# Patient Record
Sex: MALE | Race: WHITE | NOT HISPANIC OR LATINO | URBAN - METROPOLITAN AREA
[De-identification: names, ages, dates, MRNs, and addresses within clinical notes are randomized per-mention and may not be internally consistent; named-entity substitution may affect disease eponyms.]

---

## 2022-02-22 ENCOUNTER — MEDICAL CORRESPONDENCE (OUTPATIENT)
Dept: HEALTH INFORMATION MANAGEMENT | Facility: CLINIC | Age: 67
End: 2022-02-22
Payer: COMMERCIAL

## 2022-04-11 ENCOUNTER — TRANSCRIBE ORDERS (OUTPATIENT)
Dept: OTHER | Age: 67
End: 2022-04-11

## 2022-04-11 ENCOUNTER — VIRTUAL VISIT (OUTPATIENT)
Dept: UROLOGY | Facility: CLINIC | Age: 67
End: 2022-04-11
Payer: COMMERCIAL

## 2022-04-11 VITALS — WEIGHT: 220 LBS | HEIGHT: 70 IN | BODY MASS INDEX: 31.5 KG/M2

## 2022-04-11 DIAGNOSIS — R97.20 ELEVATED PROSTATE SPECIFIC ANTIGEN (PSA): Primary | ICD-10-CM

## 2022-04-11 PROCEDURE — 99203 OFFICE O/P NEW LOW 30 MIN: CPT | Mod: GT | Performed by: UROLOGY

## 2022-04-11 RX ORDER — LORAZEPAM 0.5 MG/1
0.5 TABLET ORAL
COMMUNITY
Start: 2021-11-23 | End: 2022-05-02

## 2022-04-11 RX ORDER — ZOLPIDEM TARTRATE 5 MG/1
5 TABLET ORAL
COMMUNITY
Start: 2021-11-23

## 2022-04-11 ASSESSMENT — PAIN SCALES - GENERAL: PAINLEVEL: NO PAIN (0)

## 2022-04-11 NOTE — LETTER
Date:April 12, 2022      Patient was self referred, no letter generated. Do not send.        [facility] Health Information

## 2022-04-11 NOTE — PROGRESS NOTES
*PT WILL MEET YOU IN MYCHART    Tai is a 66 year old who is being evaluated via a billable video visit.      How would you like to obtain your AVS? [facility]  If the video visit is dropped, the invitation should be resent by: Text to cell phone: [phone]  Will anyone else be joining your video visit? [facility]  Main Office: [address]  [address]  [place] [address]       CHIEF COMPLAINT:  Elevated PSA    HISTORY:   I was asked by Dr. Parra at the [facility] to see this 66-year-old gentleman who presents with an elevated PSA.  His PSA was first elevated in 2020.  He reports that he saw a urologist with [facility] with his elevated PSA and was treated with antibiotics.  He says that the PSA improved but never went back into the normal range.  It went down to 4.4.  His PSA was rechecked recently and it was up at 6.84, his highest level.  He has not had any further testing such as MRI or biopsy.  He also reports he has never had a digital rectal examination.  He has no family history of prostate cancer.  No urinary symptoms or complaints.  No history of hematuria or infections.      PAST MEDICAL HISTORY: History reviewed. No pertinent past medical history.    PAST SURGICAL HISTORY:   Past Surgical History:   Procedure Laterality Date     HERNIA REPAIR         FAMILY HISTORY: No family history on file.    SOCIAL HISTORY:   Social History     Tobacco Use     Smoking status: Never Smoker     Smokeless tobacco: Never Used   Substance Use Topics     Alcohol use: Not on file        No Known Allergies      Current Outpatient Medications:      LORazepam (ATIVAN) 0.5 MG tablet, Take 0.5 mg by mouth (Patient not taking: Reported on 4/11/2022), Disp: , Rfl:      zolpidem (AMBIEN) 5 MG tablet, Take 5 mg by mouth (Patient not taking: Reported on 4/11/2022), Disp: , Rfl:     Review Of Systems:  Skin: No rash, pruritis, or skin pigmentation  Eyes: No changes in vision  Ears/Nose/Throat:  No changes in hearing, no nosebleeds  Respiratory: No shortness of breath, dyspnea on exertion, cough, or hemoptysis  Cardiovascular: No chest pain or palpitations  Gastrointestinal: No diarrhea or constipation. No abdominal pain. No hematochezia  Genitourinary: see HPI  Musculoskeletal: No pain or swelling of joints, normal range of motion  Neurologic: No weakness or tremors  Psychiatric: No recent changes in memory or mood  Hematologic/Lymphatic/Immunologic: No easy bruising or enlarged lymph nodes  Endocrine: No weight gain or loss      PHYSICAL EXAM:    General: Alert and oriented to time, place, and self. In NAD   HEENT: Head AT/NC, EOMI, CN Grossly intact   Lungs: no respiratory distress, or pursed lip breathing   Heart: No obvious jugular venous distension present   Musculoskeltal: Normal movements. Normal appearing musculature  Skin: no suspicious lesions or rashes   Neuro: Alert, oriented, speech and mentation normal; moving all 4 extremities equally.   Psych: affect and mood normal      PSA: 6.84    UA RESULTS:  No results for input(s): COLOR, APPEARANCE, URINEGLC, URINEBILI, URINEKETONE, SG, UBLD, URINEPH, PROTEIN, UROBILINOGEN, NITRITE, LEUKEST, RBCU, WBCU in the last 05539 hours.    Bladder Scan:     Other Labs:      Imaging Studies: None      CLINICAL IMPRESSION:   Elevated PSA    PLAN:   His PSA has fluctuated in the past 2 years but it has been elevated on 4 separate checks in the past 2 years.  At this time I recommended that he at least have a digital rectal examination and the PSA rechecked, but it would also be prudent to proceed with an MRI of the prostate.  We discussed all options and he wishes to proceed with MRI of the prostate.  He understands that he may potentially have a prostate biopsy, which was discussed with him.  MRI of the prostate was ordered for him and I will go over the results with him via virtual visit.      Juan Carlos Song MD      Video Start Time: 3:15 PM  Video-Visit  Details    Type of service:  Video Visit    Video End Time:3:27 PM    Originating Location (pt. Location): Home    Distant Location (provider location):  [facility]     Platform used for Video Visit: [employer]

## 2022-04-11 NOTE — LETTER
4/11/2022       RE: Tai Gilbert  [address]  [place] [address]     Dear Colleague,    Thank you for referring your patient, Tai Gilbert, to the [facility] [place] at [facility]. Please see a copy of my visit note below.    *PT WILL MEET YOU IN GigaloHART    Tai is a 66 year old who is being evaluated via a billable video visit.      How would you like to obtain your AVS? MyChart  If the video visit is dropped, the invitation should be resent by: Text to cell phone: [phone]  Will anyone else be joining your video visit? No         [facility]  Main Office: [address]  [address]  [place] [address]       CHIEF COMPLAINT:  Elevated PSA    HISTORY:   I was asked by Dr. Parra at the [facility] to see this 66-year-old gentleman who presents with an elevated PSA.  His PSA was first elevated in 2020.  He reports that he saw a urologist with [facility] with his elevated PSA and was treated with antibiotics.  He says that the PSA improved but never went back into the normal range.  It went down to 4.4.  His PSA was rechecked recently and it was up at 6.84, his highest level.  He has not had any further testing such as MRI or biopsy.  He also reports he has never had a digital rectal examination.  He has no family history of prostate cancer.  No urinary symptoms or complaints.  No history of hematuria or infections.      PAST MEDICAL HISTORY: History reviewed. No pertinent past medical history.    PAST SURGICAL HISTORY:   Past Surgical History:   Procedure Laterality Date     HERNIA REPAIR         FAMILY HISTORY: No family history on file.    SOCIAL HISTORY:   Social History     Tobacco Use     Smoking status: Never Smoker     Smokeless tobacco: Never Used   Substance Use Topics     Alcohol use: Not on file        No Known Allergies      Current Outpatient Medications:      LORazepam (ATIVAN) 0.5 MG tablet, Take 0.5 mg  by mouth (Patient not taking: Reported on 4/11/2022), Disp: , Rfl:      zolpidem (AMBIEN) 5 MG tablet, Take 5 mg by mouth (Patient not taking: Reported on 4/11/2022), Disp: , Rfl:     Review Of Systems:  Skin: No rash, pruritis, or skin pigmentation  Eyes: No changes in vision  Ears/Nose/Throat: No changes in hearing, no nosebleeds  Respiratory: No shortness of breath, dyspnea on exertion, cough, or hemoptysis  Cardiovascular: No chest pain or palpitations  Gastrointestinal: No diarrhea or constipation. No abdominal pain. No hematochezia  Genitourinary: see HPI  Musculoskeletal: No pain or swelling of joints, normal range of motion  Neurologic: No weakness or tremors  Psychiatric: No recent changes in memory or mood  Hematologic/Lymphatic/Immunologic: No easy bruising or enlarged lymph nodes  Endocrine: No weight gain or loss      PHYSICAL EXAM:    General: Alert and oriented to time, place, and self. In NAD   HEENT: Head AT/NC, EOMI, CN Grossly intact   Lungs: no respiratory distress, or pursed lip breathing   Heart: No obvious jugular venous distension present   Musculoskeltal: Normal movements. Normal appearing musculature  Skin: no suspicious lesions or rashes   Neuro: Alert, oriented, speech and mentation normal; moving all 4 extremities equally.   Psych: affect and mood normal      PSA: 6.84    UA RESULTS:  No results for input(s): COLOR, APPEARANCE, URINEGLC, URINEBILI, URINEKETONE, SG, UBLD, URINEPH, PROTEIN, UROBILINOGEN, NITRITE, LEUKEST, RBCU, WBCU in the last 34249 hours.    Bladder Scan:     Other Labs:      Imaging Studies: None      CLINICAL IMPRESSION:   Elevated PSA    PLAN:   His PSA has fluctuated in the past 2 years but it has been elevated on 4 separate checks in the past 2 years.  At this time I recommended that he at least have a digital rectal examination and the PSA rechecked, but it would also be prudent to proceed with an MRI of the prostate.  We discussed all options and he wishes to  proceed with MRI of the prostate.  He understands that he may potentially have a prostate biopsy, which was discussed with him.  MRI of the prostate was ordered for him and I will go over the results with him via virtual visit.      Juan Carlos Song MD      Video Start Time: 3:15 PM  Video-Visit Details    Type of service:  Video Visit    Video End Time:3:27 PM    Originating Location (pt. Location): Home    Distant Location (provider location):  [facility] [place]     Platform used for Video Visit: [employer]      Again, thank you for allowing me to participate in the care of your patient.      Sincerely,    Juan Carlos Song MD

## 2022-05-01 ENCOUNTER — ANCILLARY PROCEDURE (OUTPATIENT)
Dept: MRI IMAGING | Facility: CLINIC | Age: 67
End: 2022-05-01
Attending: UROLOGY
Payer: COMMERCIAL

## 2022-05-01 DIAGNOSIS — R97.20 ELEVATED PROSTATE SPECIFIC ANTIGEN (PSA): ICD-10-CM

## 2022-05-01 PROCEDURE — 72197 MRI PELVIS W/O & W/DYE: CPT | Performed by: RADIOLOGY

## 2022-05-01 PROCEDURE — A9585 GADOBUTROL INJECTION: HCPCS | Performed by: RADIOLOGY

## 2022-05-01 RX ORDER — GADOBUTROL 604.72 MG/ML
10 INJECTION INTRAVENOUS ONCE
Status: COMPLETED | OUTPATIENT
Start: 2022-05-01 | End: 2022-05-01

## 2022-05-01 RX ADMIN — GADOBUTROL 10 ML: 604.72 INJECTION INTRAVENOUS at 08:11

## 2022-05-01 NOTE — DISCHARGE INSTRUCTIONS
MRI Contrast Discharge Instructions    The IV contrast you received today will pass out of your body in your  urine. This will happen in the next 24 hours. You will not feel this process.  Your urine will not change color.    Drink at least 4 extra glasses of water or juice today (unless your doctor  has restricted your fluids). This reduces the stress on your kidneys.  You may take your regular medicines.    If you are on dialysis: It is best to have dialysis today.    If you have a reaction: Most reactions happen right away. If you have  any new symptoms after leaving the hospital (such as hives or swelling),  call your hospital at the correct number below. Or call your family doctor.  If you have breathing distress or wheezing, call 911.    Special instructions: ***    I have read and understand the above information.    Signature:______________________________________ Date:___________    Staff:__________________________________________ Date:___________     Time:__________    [place] [facility] Departments:    ___[place]: [phone]  ___[facility]: [phone]  ___[place]: [phone] ___[facility]: [phone]  ___[facility]: [phone]  ___[facility]: [phone]  ___[phone]  ___[facility]: [phone]  ___[phone]

## 2022-05-02 ENCOUNTER — VIRTUAL VISIT (OUTPATIENT)
Dept: UROLOGY | Facility: CLINIC | Age: 67
End: 2022-05-02
Payer: COMMERCIAL

## 2022-05-02 VITALS — HEIGHT: 70 IN | WEIGHT: 220 LBS | BODY MASS INDEX: 31.5 KG/M2

## 2022-05-02 DIAGNOSIS — R97.20 ELEVATED PROSTATE SPECIFIC ANTIGEN (PSA): Primary | ICD-10-CM

## 2022-05-02 PROCEDURE — 99214 OFFICE O/P EST MOD 30 MIN: CPT | Mod: GT | Performed by: UROLOGY

## 2022-05-02 RX ORDER — CIPROFLOXACIN 500 MG/1
500 TABLET, FILM COATED ORAL 2 TIMES DAILY
Qty: 6 TABLET | Refills: 0 | Status: SHIPPED | OUTPATIENT
Start: 2022-05-02 | End: 2022-05-05

## 2022-05-02 ASSESSMENT — PAIN SCALES - GENERAL: PAINLEVEL: NO PAIN (0)

## 2022-05-02 NOTE — LETTER
5/2/2022       RE: Tai Gilbert  [address]  [place] [address]     Dear Colleague,    Thank you for referring your patient, Tai Gilbert, to the [facility] [place] at [facility]. Please see a copy of my visit note below.    TEXTLINK to cell phone.  [phone]  Tai is a 66 year old who is being evaluated via a billable video visit.      How would you like to obtain your AVS? MyChart  If the video visit is dropped, the invitation should be resent by: Text to cell phone: .  Will anyone else be joining your video visit? No         Office Visit Note  [facility]  [phone]    UROLOGIC DIAGNOSES:   Elevated PSA    CURRENT INTERVENTIONS:   MRI prostate    HISTORY:   Tai underwent MRI prostate and it showed a PI-RADS 5 lesion on the right side of the prostate.  There is also some potential inflammation/prostatitis located posteriorly in the prostate.  He feels well with no complaints at this time.      PAST MEDICAL HISTORY: History reviewed. No pertinent past medical history.    PAST SURGICAL HISTORY:   Past Surgical History:   Procedure Laterality Date     HERNIA REPAIR         FAMILY HISTORY: No family history on file.    SOCIAL HISTORY:   Social History     Tobacco Use     Smoking status: Never Smoker     Smokeless tobacco: Never Used   Substance Use Topics     Alcohol use: Not on file       REVIEW OF SYSTEMS:  Skin: No rash, pruritis, or skin pigmentation  Eyes: No changes in vision  Ears/Nose/Throat: No changes in hearing, no nosebleeds  Respiratory: No shortness of breath, dyspnea on exertion, cough, or hemoptysis  Cardiovascular: No chest pain or palpitations  Gastrointestinal: No diarrhea or constipation. No abdominal pain. No hematochezia  Genitourinary: see HPI  Musculoskeletal: No pain or swelling of joints, normal range of motion  Neurologic: No weakness or tremors  Psychiatric: No recent changes in memory or  mood  Hematologic/Lymphatic/Immunologic: No easy bruising or enlarged lymph nodes  Endocrine: No weight gain or loss      PHYSICAL EXAM:    General: Alert and oriented to time, place, and self. In NAD   HEENT: Head AT/NC, EOMI, CN Grossly intact   Lungs: no respiratory distress, or pursed lip breathing   Heart: No obvious jugular venous distension present   Musculoskeltal: Normal movements. Normal appearing musculature  Skin: no suspicious lesions or rashes   Neuro: Alert, oriented, speech and mentation normal; moving all 4 extremities equally.   Psych: affect and mood normal    Imaging: None    Urinalysis: UA RESULTS:  No results for input(s): COLOR, APPEARANCE, URINEGLC, URINEBILI, URINEKETONE, SG, UBLD, URINEPH, PROTEIN, UROBILINOGEN, NITRITE, LEUKEST, RBCU, WBCU in the last 85997 hours.    PSA: 6.84    Post Void Residual:     Other labs: None today      IMPRESSION:  Elevated PSA    PLAN:  We discussed his MRI results.  The MRI is very concerning for the presence of a prostate cancer.  There is also potentially some inflammation in the prostate.  I recommended an MRI guided fusion prostate biopsy.  We discussed prostate biopsy today along with its risks, including bleeding and infection.  We did discuss that theoretic increased risk of infection given the potential prostatitis/inflammation seen on the biopsy.  In light of this I recommended 72 hours of antibiotics as well as an injection of gentamicin at the time of biopsy in order to prevent infection.  He agrees to this and wishes to proceed.  We will get him scheduled for uronav prostate biopsy in the near future.      Juan Carlos Song M.D.              Video Start Time: 11:42 AM  Video-Visit Details    Type of service:  Video Visit    Video End Time:11:57 AM    Originating Location (pt. Location): Home    Distant Location (provider location):  [facility] [place]     Platform used for Video Visit: [employer]      Again, thank you for  allowing me to participate in the care of your patient.      Sincerely,    Juan Carlos Song MD

## 2022-05-02 NOTE — PROGRESS NOTES
TEXTLINK to cell phone.  [phone]  Tai is a 66 year old who is being evaluated via a billable video visit.      How would you like to obtain your AVS? MyChart  If the video visit is dropped, the invitation should be resent by: Text to cell phone: .  Will anyone else be joining your video visit? No         Office Visit Note  [facility]  [phone]    UROLOGIC DIAGNOSES:   Elevated PSA    CURRENT INTERVENTIONS:   MRI prostate    HISTORY:   Tai underwent MRI prostate and it showed a PI-RADS 5 lesion on the right side of the prostate.  There is also some potential inflammation/prostatitis located posteriorly in the prostate.  He feels well with no complaints at this time.      PAST MEDICAL HISTORY: History reviewed. No pertinent past medical history.    PAST SURGICAL HISTORY:   Past Surgical History:   Procedure Laterality Date     HERNIA REPAIR         FAMILY HISTORY: No family history on file.    SOCIAL HISTORY:   Social History     Tobacco Use     Smoking status: Never Smoker     Smokeless tobacco: Never Used   Substance Use Topics     Alcohol use: Not on file       REVIEW OF SYSTEMS:  Skin: No rash, pruritis, or skin pigmentation  Eyes: No changes in vision  Ears/Nose/Throat: No changes in hearing, no nosebleeds  Respiratory: No shortness of breath, dyspnea on exertion, cough, or hemoptysis  Cardiovascular: No chest pain or palpitations  Gastrointestinal: No diarrhea or constipation. No abdominal pain. No hematochezia  Genitourinary: see HPI  Musculoskeletal: No pain or swelling of joints, normal range of motion  Neurologic: No weakness or tremors  Psychiatric: No recent changes in memory or mood  Hematologic/Lymphatic/Immunologic: No easy bruising or enlarged lymph nodes  Endocrine: No weight gain or loss      PHYSICAL EXAM:    General: Alert and oriented to time, place, and self. In NAD   HEENT: Head AT/NC, EOMI, CN Grossly intact   Lungs: no respiratory distress, or pursed lip breathing    Heart: No obvious jugular venous distension present   Musculoskeltal: Normal movements. Normal appearing musculature  Skin: no suspicious lesions or rashes   Neuro: Alert, oriented, speech and mentation normal; moving all 4 extremities equally.   Psych: affect and mood normal    Imaging: None    Urinalysis: UA RESULTS:  No results for input(s): COLOR, APPEARANCE, URINEGLC, URINEBILI, URINEKETONE, SG, UBLD, URINEPH, PROTEIN, UROBILINOGEN, NITRITE, LEUKEST, RBCU, WBCU in the last 69839 hours.    PSA: 6.84    Post Void Residual:     Other labs: None today      IMPRESSION:  Elevated PSA    PLAN:  We discussed his MRI results.  The MRI is very concerning for the presence of a prostate cancer.  There is also potentially some inflammation in the prostate.  I recommended an MRI guided fusion prostate biopsy.  We discussed prostate biopsy today along with its risks, including bleeding and infection.  We did discuss that theoretic increased risk of infection given the potential prostatitis/inflammation seen on the biopsy.  In light of this I recommended 72 hours of antibiotics as well as an injection of gentamicin at the time of biopsy in order to prevent infection.  He agrees to this and wishes to proceed.  We will get him scheduled for uronav prostate biopsy in the near future.      Juan Carlos Song M.D.              Video Start Time: 11:42 AM  Video-Visit Details    Type of service:  Video Visit    Video End Time:11:57 AM    Originating Location (pt. Location): Home    Distant Location (provider location):  [facility] UROLOG[facility]     Platform used for Video Visit: [employer]

## 2022-05-29 ENCOUNTER — HEALTH MAINTENANCE LETTER (OUTPATIENT)
Age: 67
End: 2022-05-29

## 2022-06-07 ENCOUNTER — OFFICE VISIT (OUTPATIENT)
Dept: UROLOGY | Facility: CLINIC | Age: 67
End: 2022-06-07
Payer: COMMERCIAL

## 2022-06-07 VITALS
HEIGHT: 70 IN | DIASTOLIC BLOOD PRESSURE: 90 MMHG | OXYGEN SATURATION: 97 % | HEART RATE: 66 BPM | BODY MASS INDEX: 31.5 KG/M2 | WEIGHT: 220 LBS | SYSTOLIC BLOOD PRESSURE: 173 MMHG

## 2022-06-07 DIAGNOSIS — Z79.2 PROPHYLACTIC ANTIBIOTIC: Primary | ICD-10-CM

## 2022-06-07 DIAGNOSIS — R97.20 ELEVATED PROSTATE SPECIFIC ANTIGEN (PSA): ICD-10-CM

## 2022-06-07 PROCEDURE — 55700 PR BIOPSY OF PROSTATE,NEEDLE/PUNCH: CPT | Performed by: UROLOGY

## 2022-06-07 PROCEDURE — 76942 ECHO GUIDE FOR BIOPSY: CPT | Performed by: UROLOGY

## 2022-06-07 PROCEDURE — 96372 THER/PROPH/DIAG INJ SC/IM: CPT | Mod: 59 | Performed by: UROLOGY

## 2022-06-07 PROCEDURE — 88305 TISSUE EXAM BY PATHOLOGIST: CPT | Performed by: PATHOLOGY

## 2022-06-07 RX ORDER — LIDOCAINE HYDROCHLORIDE 10 MG/ML
15 INJECTION, SOLUTION INFILTRATION; PERINEURAL ONCE
Status: COMPLETED | OUTPATIENT
Start: 2022-06-07 | End: 2022-06-07

## 2022-06-07 RX ORDER — GENTAMICIN 40 MG/ML
80 INJECTION, SOLUTION INTRAMUSCULAR; INTRAVENOUS ONCE
Status: COMPLETED | OUTPATIENT
Start: 2022-06-07 | End: 2022-06-07

## 2022-06-07 RX ORDER — CIPROFLOXACIN 500 MG/1
TABLET, FILM COATED ORAL
COMMUNITY
Start: 2022-05-31

## 2022-06-07 RX ADMIN — LIDOCAINE HYDROCHLORIDE 15 ML: 10 INJECTION, SOLUTION INFILTRATION; PERINEURAL at 09:35

## 2022-06-07 RX ADMIN — GENTAMICIN 80 MG: 40 INJECTION, SOLUTION INTRAMUSCULAR; INTRAVENOUS at 09:31

## 2022-06-07 ASSESSMENT — PAIN SCALES - GENERAL: PAINLEVEL: NO PAIN (0)

## 2022-06-07 NOTE — PROGRESS NOTES
Here for an MRI-ultrasound-fusion guided biopsy of the prostate    We previously obtained an MRI of the prostate and identified all PIRADS 4-5 lesions for targeting    Target Lesion #1 Right sided lesion    Prostate volume on MRI 34 g    No results found for: PSA    An enema was completed and 500 mg of Cipro was given prior to the biopsy.  After obtaining informed consent patient was placed in lateral decubitus position.  The ultrasound probe was placed in the rectum.  The prostate was numbed using ultrasound guidance with 1% lidocaine 5 mls along each nerve bundle.  US images were obtained and then fused with the MRI images.  The fused images were then used to guide the biopsy of the targeted lesions with at least 2 cores taken of each lesion.  We then performed random biopsies.  12 cores were taken with 6 on each side, base, mid and apex.  The patient tolerated the procedure well.      We will follow up with the results in 7-10 days and contact patient with these results.

## 2022-06-07 NOTE — NURSING NOTE
he following medication was given:     MEDICATION:  Lidocaine 1% Soln  ROUTE: Local Infiltration   SITE: Prostate  DOSE: 200mg/20ml  LOT #: [id number]  : [employer]  EXPIRATION DATE:01/2024    NDC#: 80552-508-66  Was there drug waste? Yes  Amount of drug waste (mL): 0.  Reason for waste:  As per MD  Multi-dose vial: Yes    Margarita Bah LPN

## 2022-06-07 NOTE — NURSING NOTE
Chief Complaint   Patient presents with     Elevated PSA     Patient is here for Transrectal Ultrasound/MRI Guided Prostate Biopsies      Procedure was explained to patient prior to performing said procedure.  The patient signed the [place] consent form and all questions were answered prior to the procedure.  Any pre-procedural antibiotics were given according to the performing physician's recommendation.  Patient reviewed information on the labels attached to samples and confirmed the accuracy of all of the labels.     Performing Physician:  Dr. Song  Antibiotic taken?  yes  Aspirin or other blood thinning medications discontinued 7-10 days:  yes  Time of enema: Yes  Patient given Gentamicin intramuscular injection 30 minutes prior to procedure  Yes    Twelve samples were taken from the right and left, medial and lateral base, mid and apex of the prostate respectively. Three  additional samples were taken from Target Lesion.  Vitals were repeated prior to patient leaving and instructions for post TRUS care were explained to the patient. Following procedure, patient did not want to stay to recheck BP. Instructed him to call our office or go to ER if feeling unwell. Patient expressed understanding and informed this nurse that his wife will drive him home.     Margarita Bah LPN

## 2022-06-07 NOTE — LETTER
6/7/2022       RE: Tai Gilbert  [address]  [place] [address]     Dear Colleague,    Thank you for referring your patient, Tai Gilbert, to the [facility]OLOG[facility] CLIN[facility] [place] at [facility]. Please see a copy of my visit note below.    Here for an MRI-ultrasound-fusion guided biopsy of the prostate    We previously obtained an MRI of the prostate and identified all PIRADS 4-5 lesions for targeting    Target Lesion #1 Right sided lesion    Prostate volume on MRI 34 g    No results found for: PSA    An enema was completed and 500 mg of Cipro was given prior to the biopsy.  After obtaining informed consent patient was placed in lateral decubitus position.  The ultrasound probe was placed in the rectum.  The prostate was numbed using ultrasound guidance with 1% lidocaine 5 mls along each nerve bundle.  US images were obtained and then fused with the MRI images.  The fused images were then used to guide the biopsy of the targeted lesions with at least 2 cores taken of each lesion.  We then performed random biopsies.  12 cores were taken with 6 on each side, base, mid and apex.  The patient tolerated the procedure well.      We will follow up with the results in 7-10 days and contact patient with these results.       Again, thank you for allowing me to participate in the care of your patient.      Sincerely,    Juan Carlos Song MD

## 2022-06-07 NOTE — NURSING NOTE
The following medication was given:     MEDICATION: Gentamycin  ROUTE: IM  SITE: [facility]  DOSE: 80mg  LOT #: [id number]  :    EXPIRATION DATE:  03/23  NDC#: 21413674161   Drug wasted  No .

## 2022-06-07 NOTE — PATIENT INSTRUCTIONS
[facility]  Transrectal Ultrasound  Post Operative Information    The physician who performed your Transrectal Ultrasound is Dr. Song (telephone number [phone]).  Please contact this doctor if you have any problems or questions.  If unable to reach your doctor, please return to the Emergency Department.    Take one antibiotic the evening of the procedure and then as directed on your prescription.  Drink at least 6-8 glasses of fluids for the first 48 hours.  Avoid heavy lifting and strenuous activity for 48 hours.  Avoid sexual intercourse for the first 24 hours.  No aspirin or ibuprofen products (Motrin, Advil, Nuprin, ect.) for one week.  You may take acetaminophen (Tylenol) for pain.  You may notice a small amount of blood on the tissue after a bowel movement.  You may pass blood with clots in your urine following the procedure.  The amount will decrease with time but may be visible for up to two weeks.   You make have blood in your semen for 4 weeks after the procedure.  You may experience mild perineal (groin area) discomfort after the procedure.  Please call you doctor if you have any of the follow symptoms:  Fever  Increase in the amount of blood passed  Severe discomfort or pain

## 2022-06-08 LAB
PATH REPORT.COMMENTS IMP SPEC: ABNORMAL
PATH REPORT.COMMENTS IMP SPEC: YES
PATH REPORT.FINAL DX SPEC: ABNORMAL
PATH REPORT.GROSS SPEC: ABNORMAL
PATH REPORT.MICROSCOPIC SPEC OTHER STN: ABNORMAL
PATH REPORT.RELEVANT HX SPEC: ABNORMAL
PHOTO IMAGE: ABNORMAL

## 2022-06-09 ENCOUNTER — VIRTUAL VISIT (OUTPATIENT)
Dept: UROLOGY | Facility: CLINIC | Age: 67
End: 2022-06-09
Payer: COMMERCIAL

## 2022-06-09 VITALS — BODY MASS INDEX: 31.5 KG/M2 | WEIGHT: 220 LBS | HEIGHT: 70 IN

## 2022-06-09 DIAGNOSIS — C61 PROSTATE CANCER (H): Primary | ICD-10-CM

## 2022-06-09 PROCEDURE — 99215 OFFICE O/P EST HI 40 MIN: CPT | Mod: GT | Performed by: UROLOGY

## 2022-06-09 ASSESSMENT — PAIN SCALES - GENERAL: PAINLEVEL: NO PAIN (0)

## 2022-06-09 NOTE — PROGRESS NOTES
SEND LINK TO CELL PHONE    Tai is a 66 year old who is being evaluated via a billable video visit.      How would you like to obtain your AVS? MyChart  If the video visit is dropped, the invitation should be resent by: Text to cell phone: [phone]  Will anyone else be joining your video visit? No         Office Visit Note  [facility]  [phone]    UROLOGIC DIAGNOSES:   T1C [place] 3+4 equal 7 prostate cancer    CURRENT INTERVENTIONS:       HISTORY:   Tai is set up for virtual visit today in order to go over his prostate biopsy results.  The targeted biopsies taken from the lesion seen on the right side of the prostate showed a [place] 3+4 equal 7 prostate cancer.  In addition, 9 out of 12 of his template cores were also positive for prostate cancer.  Positive cores were located on both sides and  showed both [place] grade 6 and grade 7 cancer.  He has felt well since his biopsy and has no urinary complaints.  He reports normal erectile function without the help of any medication.  He has had a prior inguinal hernia repair and appendectomy.      PAST MEDICAL HISTORY: History reviewed. No pertinent past medical history.    PAST SURGICAL HISTORY:   Past Surgical History:   Procedure Laterality Date     HERNIA REPAIR         FAMILY HISTORY:   Family History   Problem Relation Age of Onset     Heart Disease Mother      Cancer Father      Enlarged prostate Son        SOCIAL HISTORY:   Social History     Tobacco Use     Smoking status: Never Smoker     Smokeless tobacco: Never Used   Substance Use Topics     Alcohol use: Not on file       REVIEW OF SYSTEMS:  Skin: No rash, pruritis, or skin pigmentation  Eyes: No changes in vision  Ears/Nose/Throat: No changes in hearing, no nosebleeds  Respiratory: No shortness of breath, dyspnea on exertion, cough, or hemoptysis  Cardiovascular: No chest pain or palpitations  Gastrointestinal: No diarrhea or constipation. No abdominal pain. No  hematochezia  Genitourinary: see HPI  Musculoskeletal: No pain or swelling of joints, normal range of motion  Neurologic: No weakness or tremors  Psychiatric: No recent changes in memory or mood  Hematologic/Lymphatic/Immunologic: No easy bruising or enlarged lymph nodes  Endocrine: No weight gain or loss      PHYSICAL EXAM:    General: Alert and oriented to time, place, and self. In NAD   HEENT: Head AT/NC, EOMI, CN Grossly intact   Lungs: no respiratory distress, or pursed lip breathing   Heart: No obvious jugular venous distension present   Musculoskeltal: Normal movements. Normal appearing musculature  Skin: no suspicious lesions or rashes   Neuro: Alert, oriented, speech and mentation normal; moving all 4 extremities equally.   Psych: affect and mood normal    Imaging: None    Urinalysis: UA RESULTS:  No results for input(s): COLOR, APPEARANCE, URINEGLC, URINEBILI, URINEKETONE, SG, UBLD, URINEPH, PROTEIN, UROBILINOGEN, NITRITE, LEUKEST, RBCU, WBCU in the last 78751 hours.    PSA: 6.84    Post Void Residual:     Other labs: None today      IMPRESSION:  Favorable intermediate risk prostate cancer    PLAN:  He has been diagnosed with a favorable intermediate risk prostate cancer.  We discussed the prostate cancer diagnosis along with the natural history of prostate cancer.  We discussed treatment options.    We discussed active surveillance as treatment option.  We discussed the rationale of active surveillance and the risks of active surveillance.  I counseled him that I would not recommend active surveillance in his case given his good health and his great 7 cancer with multiple cores positive.    We discussed robotic assisted laparoscopic radical prostatectomy with bilateral pelvic lymph node as a treatment option.  We discussed the risks of surgery along with its expected recovery.    We discussed radiotherapy combined with 6 months of hormonal therapy as a treatment option.  We also discussed the risks and  expected recovery of radiotherapy.  In particular, he had questions about brachytherapy as he has a brother-in-law who was treated with brachytherapy.  These were answered for him as well.    We also discussed ablative treatment options today.  He had multiple questions about ablative treatment options that he had been reading about today as well.  Today we discussed cryotherapy, focal laser ablation, HIFU, and also CyberKnife treatments.  He was counseled that, while these treatments are sometimes employed for prostate cancer treatment, I would not recommend these treatments for a man with a grade 7 cancer who is in good health.  I recommended that he consider either prostatectomy or radiotherapy combined with hormonal therapy has his ideal treatment options.    We had a lengthy discussion today, well over 1 hour, about various treatment options and we ended up going into great detail regarding all treatment options.  He is a good surgical candidate.  If he underwent surgery I counseled him that I think a nerve sparing procedure would be prudent on the left side but not the right side.    He would like some time to think over his treatment options.  He is very undecided as to what to do at this time.  In the meantime I encouraged him to discuss radiotherapy with radiation oncology.  He is also interested in discussing brachytherapy specifically at that appointment.  I will have him see Dr. Bryan to discuss all modalities of radiotherapy, including brachytherapy, and I will see him back again in clinic after that discussion.          Juan Carlos Song M.D.              Video Start Time: 2:29 PM  Video-Visit Details    Type of service:  Video Visit    Video End Time:3:44 PM    Originating Location (pt. Location): Home    Distant Location (provider location):  [facility] [place]     Platform used for Video Visit: [employer]

## 2022-06-09 NOTE — LETTER
6/9/2022       RE: Tai Gilbert  [address]  [place] [address]     Dear Colleague,    Thank you for referring your patient, Tai Gilbert, to the [facility] [place] [facility]. Please see a copy of my visit note below.    SEND LINK TO CELL PHONE    Tai is a 66 year old who is being evaluated via a billable video visit.      How would you like to obtain your AVS? MyChart  If the video visit is dropped, the invitation should be resent by: Text to cell phone: [phone]  Will anyone else be joining your video visit? No         Office Visit Note  [facility]  [phone]    UROLOGIC DIAGNOSES:   T1C [place] 3+4 equal 7 prostate cancer    CURRENT INTERVENTIONS:       HISTORY:   Tai is set up for virtual visit today in order to go over his prostate biopsy results.  The targeted biopsies taken from the lesion seen on the right side of the prostate showed a [place] 3+4 equal 7 prostate cancer.  In addition, 9 out of 12 of his template cores were also positive for prostate cancer.  Positive cores were located on both sides and  showed both [place] grade 6 and grade 7 cancer.  He has felt well since his biopsy and has no urinary complaints.  He reports normal erectile function without the help of any medication.  He has had a prior inguinal hernia repair and appendectomy.      PAST MEDICAL HISTORY: History reviewed. No pertinent past medical history.    PAST SURGICAL HISTORY:   Past Surgical History:   Procedure Laterality Date     HERNIA REPAIR         FAMILY HISTORY:   Family History   Problem Relation Age of Onset     Heart Disease Mother      Cancer Father      Enlarged prostate Son        SOCIAL HISTORY:   Social History     Tobacco Use     Smoking status: Never Smoker     Smokeless tobacco: Never Used   Substance Use Topics     Alcohol use: Not on file       REVIEW OF SYSTEMS:  Skin: No rash, pruritis, or skin pigmentation  Eyes:  No changes in vision  Ears/Nose/Throat: No changes in hearing, no nosebleeds  Respiratory: No shortness of breath, dyspnea on exertion, cough, or hemoptysis  Cardiovascular: No chest pain or palpitations  Gastrointestinal: No diarrhea or constipation. No abdominal pain. No hematochezia  Genitourinary: see HPI  Musculoskeletal: No pain or swelling of joints, normal range of motion  Neurologic: No weakness or tremors  Psychiatric: No recent changes in memory or mood  Hematologic/Lymphatic/Immunologic: No easy bruising or enlarged lymph nodes  Endocrine: No weight gain or loss      PHYSICAL EXAM:    General: Alert and oriented to time, place, and self. In NAD   HEENT: Head AT/NC, EOMI, CN Grossly intact   Lungs: no respiratory distress, or pursed lip breathing   Heart: No obvious jugular venous distension present   Musculoskeltal: Normal movements. Normal appearing musculature  Skin: no suspicious lesions or rashes   Neuro: Alert, oriented, speech and mentation normal; moving all 4 extremities equally.   Psych: affect and mood normal    Imaging: None    Urinalysis: UA RESULTS:  No results for input(s): COLOR, APPEARANCE, URINEGLC, URINEBILI, URINEKETONE, SG, UBLD, URINEPH, PROTEIN, UROBILINOGEN, NITRITE, LEUKEST, RBCU, WBCU in the last 03500 hours.    PSA: 6.84    Post Void Residual:     Other labs: None today      IMPRESSION:  Favorable intermediate risk prostate cancer    PLAN:  He has been diagnosed with a favorable intermediate risk prostate cancer.  We discussed the prostate cancer diagnosis along with the natural history of prostate cancer.  We discussed treatment options.    We discussed active surveillance as treatment option.  We discussed the rationale of active surveillance and the risks of active surveillance.  I counseled him that I would not recommend active surveillance in his case given his good health and his great 7 cancer with multiple cores positive.    We discussed robotic assisted laparoscopic  radical prostatectomy with bilateral pelvic lymph node as a treatment option.  We discussed the risks of surgery along with its expected recovery.    We discussed radiotherapy combined with 6 months of hormonal therapy as a treatment option.  We also discussed the risks and expected recovery of radiotherapy.  In particular, he had questions about brachytherapy as he has a brother-in-law who was treated with brachytherapy.  These were answered for him as well.    We also discussed ablative treatment options today.  He had multiple questions about ablative treatment options that he had been reading about today as well.  Today we discussed cryotherapy, focal laser ablation, HIFU, and also CyberKnife treatments.  He was counseled that, while these treatments are sometimes employed for prostate cancer treatment, I would not recommend these treatments for a man with a grade 7 cancer who is in good health.  I recommended that he consider either prostatectomy or radiotherapy combined with hormonal therapy has his ideal treatment options.    We had a lengthy discussion today, well over 1 hour, about various treatment options and we ended up going into great detail regarding all treatment options.  He is a good surgical candidate.  If he underwent surgery I counseled him that I think a nerve sparing procedure would be prudent on the left side but not the right side.    He would like some time to think over his treatment options.  He is very undecided as to what to do at this time.  In the meantime I encouraged him to discuss radiotherapy with radiation oncology.  He is also interested in discussing brachytherapy specifically at that appointment.  I will have him see Dr. Bryan to discuss all modalities of radiotherapy, including brachytherapy, and I will see him back again in clinic after that discussion.          Juan Carlos Song M.D.              Video Start Time: 2:29 PM  Video-Visit Details    Type of service:  Video  Visit    Video End Time:3:44 PM    Originating Location (pt. Location): Home    Distant Location (provider location):  [facility]     Platform used for Video Visit: Chris

## 2022-06-28 ENCOUNTER — TRANSFERRED RECORDS (OUTPATIENT)
Dept: HEALTH INFORMATION MANAGEMENT | Facility: CLINIC | Age: 67
End: 2022-06-28

## 2022-07-01 ENCOUNTER — VIRTUAL VISIT (OUTPATIENT)
Dept: UROLOGY | Facility: CLINIC | Age: 67
End: 2022-07-01
Payer: COMMERCIAL

## 2022-07-01 VITALS — BODY MASS INDEX: 31.5 KG/M2 | WEIGHT: 220 LBS | HEIGHT: 70 IN

## 2022-07-01 DIAGNOSIS — C61 PROSTATE CANCER (H): Primary | ICD-10-CM

## 2022-07-01 PROCEDURE — 99213 OFFICE O/P EST LOW 20 MIN: CPT | Mod: GT | Performed by: UROLOGY

## 2022-07-01 RX ORDER — LORAZEPAM 0.5 MG/1
0.5 TABLET ORAL PRN
COMMUNITY

## 2022-07-01 ASSESSMENT — PAIN SCALES - GENERAL: PAINLEVEL: NO PAIN (0)

## 2022-07-01 NOTE — LETTER
7/1/2022       RE: Tai Gilbert  [address]  [place] [address]     Dear Colleague,    Thank you for referring your patient, Tai Gilbert, to the [facility] [place] at [facility]. Please see a copy of my visit note below.    PT WILL MEET YOU IN [employer]    Tai is a 66 year old who is being evaluated via a billable video visit.      How would you like to obtain your AVS? MyChart  If the video visit is dropped, the invitation should be resent by: Text to cell phone: [phone]  Will anyone else be joining your video visit? No        Office Visit Note  [facility]  [phone]    UROLOGIC DIAGNOSES:   T1C [place] 3+4 equal 7 prostate cancer    CURRENT INTERVENTIONS:       HISTORY:   Tai met with Dr. Andrea Bryan to discuss radiotherapy options and they discussed radiotherapy monotherapy versus radiotherapy combined with hormonal therapy versus hormonal therapy combined with brachytherapy and shorter course of external beam therapy.    He has been thinking over his options today and he would like to pursue active surveillance if possible.  He is hesitant to have either radiotherapy or surgery for his prostate cancer.      PAST MEDICAL HISTORY: History reviewed. No pertinent past medical history.    PAST SURGICAL HISTORY:   Past Surgical History:   Procedure Laterality Date     HERNIA REPAIR         FAMILY HISTORY:   Family History   Problem Relation Age of Onset     Heart Disease Mother      Cancer Father      Enlarged prostate Son        SOCIAL HISTORY:   Social History     Tobacco Use     Smoking status: Never Smoker     Smokeless tobacco: Never Used   Substance Use Topics     Alcohol use: Not on file       REVIEW OF SYSTEMS:  Skin: No rash, pruritis, or skin pigmentation  Eyes: No changes in vision  Ears/Nose/Throat: No changes in hearing, no nosebleeds  Respiratory: No shortness of breath, dyspnea on exertion, cough, or  hemoptysis Cardiovascular: No chest pain or palpitations  Gastrointestinal: No diarrhea or constipation. No abdominal pain. No hematochezia  Genitourinary: see HPI  Musculoskeletal: No pain or swelling of joints, normal range of motion  Neurologic: No weakness or tremors  Psychiatric: No recent changes in memory or mood  Hematologic/Lymphatic/Immunologic: No easy bruising or enlarged lymph nodes  Endocrine: No weight gain or loss      PHYSICAL EXAM:    General: Alert and oriented to time, place, and self. In NAD   HEENT: Head AT/NC, EOMI, CN Grossly intact   Lungs: no respiratory distress, or pursed lip breathing   Heart: No obvious jugular venous distension present   Musculoskeltal: Normal movements. Normal appearing musculature  Skin: no suspicious lesions or rashes   Neuro: Alert, oriented, speech and mentation normal; moving all 4 extremities equally.   Psych: affect and mood normal    Imaging: None    Urinalysis: UA RESULTS:  No results for input(s): COLOR, APPEARANCE, URINEGLC, URINEBILI, URINEKETONE, SG, UBLD, URINEPH, PROTEIN, UROBILINOGEN, NITRITE, LEUKEST, RBCU, WBCU in the last 73249 hours.    PSA: 6.84    Post Void Residual:     Other labs: None today      IMPRESSION:  Intermediate risk prostate cancer    PLAN:  We again discussed his prostate cancer diagnosis and briefly discussed his treatment options again.  He understands that I recommend against pursuing active surveillance given concerning features on his pathology as well as MRI of the prostate.  He understands the risks of cancer progression if treatment for his prostate cancer is delayed.  Nonetheless, he would prefer to check another PSA in 3 months.  I recommended that if he is going to pursue active surveillance, that he proceed with [employer] [facility] testing.  We discussed the test and he agreed to it.    I will contact him once the decipher results are available.  We will tentatively plan on the appointment in 3 months with another  PSA.      Juan Carlos Song M.D.              Video-Visit Details    Video Start Time: 8:17 AM    Type of service:  Video Visit    Video End Time:8:30 AM    Originating Location (pt. Location): Home    Distant Location (provider location):  [facility] [place]     Platform used for Video Visit: [employer]

## 2022-07-01 NOTE — PROGRESS NOTES
PT WILL MEET YOU IN [employer]    Tai is a 66 year old who is being evaluated via a billable video visit.      How would you like to obtain your AVS? Adan  If the video visit is dropped, the invitation should be resent by: Text to cell phone: [phone]  Will anyone else be joining your video visit? No        Office Visit Note  [facility]  [phone]    UROLOGIC DIAGNOSES:   T1C [place] 3+4 equal 7 prostate cancer    CURRENT INTERVENTIONS:       HISTORY:   Tai met with Dr. Andrea Bryan to discuss radiotherapy options and they discussed radiotherapy monotherapy versus radiotherapy combined with hormonal therapy versus hormonal therapy combined with brachytherapy and shorter course of external beam therapy.    He has been thinking over his options today and he would like to pursue active surveillance if possible.  He is hesitant to have either radiotherapy or surgery for his prostate cancer.      PAST MEDICAL HISTORY: History reviewed. No pertinent past medical history.    PAST SURGICAL HISTORY:   Past Surgical History:   Procedure Laterality Date     HERNIA REPAIR         FAMILY HISTORY:   Family History   Problem Relation Age of Onset     Heart Disease Mother      Cancer Father      Enlarged prostate Son        SOCIAL HISTORY:   Social History     Tobacco Use     Smoking status: Never Smoker     Smokeless tobacco: Never Used   Substance Use Topics     Alcohol use: Not on file       REVIEW OF SYSTEMS:  Skin: No rash, pruritis, or skin pigmentation  Eyes: No changes in vision  Ears/Nose/Throat: No changes in hearing, no nosebleeds  Respiratory: No shortness of breath, dyspnea on exertion, cough, or hemoptysis Cardiovascular: No chest pain or palpitations  Gastrointestinal: No diarrhea or constipation. No abdominal pain. No hematochezia  Genitourinary: see HPI  Musculoskeletal: No pain or swelling of joints, normal range of motion  Neurologic: No weakness or tremors  Psychiatric: No recent  changes in memory or mood  Hematologic/Lymphatic/Immunologic: No easy bruising or enlarged lymph nodes  Endocrine: No weight gain or loss      PHYSICAL EXAM:    General: Alert and oriented to time, place, and self. In NAD   HEENT: Head AT/NC, EOMI, CN Grossly intact   Lungs: no respiratory distress, or pursed lip breathing   Heart: No obvious jugular venous distension present   Musculoskeltal: Normal movements. Normal appearing musculature  Skin: no suspicious lesions or rashes   Neuro: Alert, oriented, speech and mentation normal; moving all 4 extremities equally.   Psych: affect and mood normal    Imaging: None    Urinalysis: UA RESULTS:  No results for input(s): COLOR, APPEARANCE, URINEGLC, URINEBILI, URINEKETONE, SG, UBLD, URINEPH, PROTEIN, UROBILINOGEN, NITRITE, LEUKEST, RBCU, WBCU in the last 97337 hours.    PSA: 6.84    Post Void Residual:     Other labs: None today      IMPRESSION:  Intermediate risk prostate cancer    PLAN:  We again discussed his prostate cancer diagnosis and briefly discussed his treatment options again.  He understands that I recommend against pursuing active surveillance given concerning features on his pathology as well as MRI of the prostate.  He understands the risks of cancer progression if treatment for his prostate cancer is delayed.  Nonetheless, he would prefer to check another PSA in 3 months.  I recommended that if he is going to pursue active surveillance, that he proceed with [employer] [facility] testing.  We discussed the test and he agreed to it.    I will contact him once the decipher results are available.  We will tentatively plan on the appointment in 3 months with another PSA.      Juan Carlos Song M.D.              Video-Visit Details    Video Start Time: 8:17 AM    Type of service:  Video Visit    Video End Time:8:30 AM    Originating Location (pt. Location): Home    Distant Location (provider location):  [facility] UROLOG[facility]     Platform used  for Video Visit: Br[employer]

## 2022-08-01 LAB — SCANNED LAB RESULT: NORMAL

## 2022-08-15 ENCOUNTER — TELEPHONE (OUTPATIENT)
Dept: SURGERY | Facility: CLINIC | Age: 67
End: 2022-08-15

## 2022-08-15 NOTE — TELEPHONE ENCOUNTER
Spoke on phone re: Decipher test results  Decipher low risk  We will continue with plan for him to see me in October for another PSA check.

## 2022-10-03 ENCOUNTER — HEALTH MAINTENANCE LETTER (OUTPATIENT)
Age: 67
End: 2022-10-03

## 2022-10-06 ENCOUNTER — OFFICE VISIT (OUTPATIENT)
Dept: UROLOGY | Facility: CLINIC | Age: 67
End: 2022-10-06
Payer: COMMERCIAL

## 2022-10-06 VITALS
BODY MASS INDEX: 30.35 KG/M2 | DIASTOLIC BLOOD PRESSURE: 70 MMHG | SYSTOLIC BLOOD PRESSURE: 128 MMHG | WEIGHT: 212 LBS | HEIGHT: 70 IN

## 2022-10-06 DIAGNOSIS — C61 PROSTATE CANCER (H): Primary | ICD-10-CM

## 2022-10-06 PROCEDURE — 99214 OFFICE O/P EST MOD 30 MIN: CPT | Performed by: UROLOGY

## 2022-10-06 ASSESSMENT — PAIN SCALES - GENERAL: PAINLEVEL: NO PAIN (0)

## 2022-10-06 NOTE — LETTER
10/6/2022       RE: Tai Gilbert  [address]  [facility] [address]     Dear Colleague,    Thank you for referring your patient, Tai Gilbert, to the [facility] at [facility]. Please see a copy of my visit note below.    Office Visit Note  [facility]  [phone]    UROLOGIC DIAGNOSES:   Favorable intermediate risk prostate cancer    CURRENT INTERVENTIONS:   Active surveillance    HISTORY:   Tai returns to clinic today for active surveillance of his prostate cancer.  His decipher test came back consistent with a low risk prostate cancer.  I continue to recommend treatment for his prostate cancer but he wished to proceed with active surveillance.  He reports no symptoms or problems over the past 6 months.  A recent PSA was down slightly at 6.32      PAST MEDICAL HISTORY: No past medical history on file.    PAST SURGICAL HISTORY:   Past Surgical History:   Procedure Laterality Date     HERNIA REPAIR         FAMILY HISTORY:   Family History   Problem Relation Age of Onset     Heart Disease Mother      Cancer Father      Enlarged prostate Son        SOCIAL HISTORY:   Social History     Socioeconomic History     Marital status:    Tobacco Use     Smoking status: Never Smoker     Smokeless tobacco: Never Used       Review Of Systems:  Skin: No rash, pruritis, or skin pigmentation  Eyes: No changes in vision  Ears/Nose/Throat: No changes in hearing, no nosebleeds  Respiratory: No shortness of breath, dyspnea on exertion, cough, or hemoptysis  Cardiovascular: No chest pain or palpitations  Gastrointestinal: No diarrhea or constipation. No abdominal pain. No hematochezia  Genitourinary: see HPI  Musculoskeletal: No pain or swelling of joints, normal range of motion  Neurologic: No weakness or tremors  Psychiatric: No recent changes in memory or mood  Hematologic/Lymphatic/Immunologic: No easy bruising or enlarged lymph  nodes  Endocrine: No weight gain or loss      PHYSICAL EXAM:    There were no vitals taken for this visit.    Constitutional: Well developed. Conversant and in no acute distress  Eyes: Anicteric sclera, conjunctiva clear, normal extraocular movements  ENT: Normocephalic and atraumatic,   Skin: Warm and dry. No rashes or lesions  Cardiac: No peripheral edema  Back/Flank: Not done  CNS/PNS: Normal musculature and movements, moves all extremities normally  Respiratory: Normal non-labored breathing  Abdomen: Soft nontender and nondistended  Peripheral Vascular: No peripheral edema  Mental Status/Psych: Alert and Oriented x 3. Normal mood and affect    Penis: Not done  Scrotal Skin: Not done  Testicles: Not done  Epididymis: Not done  Digital Rectal Exam:     Cystoscopy: Not done    Imaging: None    Urinalysis: UA RESULTS:  No results for input(s): COLOR, APPEARANCE, URINEGLC, URINEBILI, URINEKETONE, SG, UBLD, URINEPH, PROTEIN, UROBILINOGEN, NITRITE, LEUKEST, RBCU, WBCU in the last 66079 hours.    PSA: 6.32    Post Void Residual:     Other labs: None today      IMPRESSION:  Intermediate risk prostate cancer    PLAN:  We again discussed the state of his prostate cancer as well as treatment options.  He understands that there are different tests to weigh against 1 another when considering his risk for prostate cancer progression.  He understands that there are many concerning findings on the MRI of the prostate as well as on his prostate biopsy.  However, the PSA is also stable and the decipher test came back consistent with a low-grade prostate cancer.    Due to the concerning findings on the MRI as well as biopsy data Tai understands that I continue to recommend radical treatment for his prostate cancer in the form of either surgery or radiotherapy.  He would like to proceed with active surveillance if possible.  He has a good understanding of the risks of active surveillance.  He also did ask about ablative options  today.  We discussed ablative options, but he would not really be a candidate for partial gland therapy since cancer was also found on the left side of the prostate, where the MRI did not detect prostate cancer.  He could consider whole gland therapy such as cryotherapy.    He wishes to continue with active surveillance.  In light of that I recommended that we repeat a PSA as well as MRI of the prostate in 6 months.  We will discuss the results but at that time we will also need to proceed with confirmatory prostate biopsy and potentially send this second biopsy for decipher testing again.    Total time spent today in review of outside records and test results, discussion with the patient, and documentation: 25 minutes      Juan Carlos Song M.D.

## 2022-10-06 NOTE — PROGRESS NOTES
Office Visit Note  [facility]  [phone]    UROLOGIC DIAGNOSES:   Favorable intermediate risk prostate cancer    CURRENT INTERVENTIONS:   Active surveillance    HISTORY:   Tai returns to clinic today for active surveillance of his prostate cancer.  His decipher test came back consistent with a low risk prostate cancer.  I continue to recommend treatment for his prostate cancer but he wished to proceed with active surveillance.  He reports no symptoms or problems over the past 6 months.  A recent PSA was down slightly at 6.32      PAST MEDICAL HISTORY: No past medical history on file.    PAST SURGICAL HISTORY:   Past Surgical History:   Procedure Laterality Date     HERNIA REPAIR         FAMILY HISTORY:   Family History   Problem Relation Age of Onset     Heart Disease Mother      Cancer Father      Enlarged prostate Son        SOCIAL HISTORY:   Social History     Socioeconomic History     Marital status:    Tobacco Use     Smoking status: Never Smoker     Smokeless tobacco: Never Used       Review Of Systems:  Skin: No rash, pruritis, or skin pigmentation  Eyes: No changes in vision  Ears/Nose/Throat: No changes in hearing, no nosebleeds  Respiratory: No shortness of breath, dyspnea on exertion, cough, or hemoptysis  Cardiovascular: No chest pain or palpitations  Gastrointestinal: No diarrhea or constipation. No abdominal pain. No hematochezia  Genitourinary: see HPI  Musculoskeletal: No pain or swelling of joints, normal range of motion  Neurologic: No weakness or tremors  Psychiatric: No recent changes in memory or mood  Hematologic/Lymphatic/Immunologic: No easy bruising or enlarged lymph nodes  Endocrine: No weight gain or loss      PHYSICAL EXAM:    There were no vitals taken for this visit.    Constitutional: Well developed. Conversant and in no acute distress  Eyes: Anicteric sclera, conjunctiva clear, normal extraocular movements  ENT: Normocephalic and atraumatic,   Skin: Warm and  dry. No rashes or lesions  Cardiac: No peripheral edema  Back/Flank: Not done  CNS/PNS: Normal musculature and movements, moves all extremities normally  Respiratory: Normal non-labored breathing  Abdomen: Soft nontender and nondistended  Peripheral Vascular: No peripheral edema  Mental Status/Psych: Alert and Oriented x 3. Normal mood and affect    Penis: Not done  Scrotal Skin: Not done  Testicles: Not done  Epididymis: Not done  Digital Rectal Exam:     Cystoscopy: Not done    Imaging: None    Urinalysis: UA RESULTS:  No results for input(s): COLOR, APPEARANCE, URINEGLC, URINEBILI, URINEKETONE, SG, UBLD, URINEPH, PROTEIN, UROBILINOGEN, NITRITE, LEUKEST, RBCU, WBCU in the last 81453 hours.    PSA: 6.32    Post Void Residual:     Other labs: None today      IMPRESSION:  Intermediate risk prostate cancer    PLAN:  We again discussed the state of his prostate cancer as well as treatment options.  He understands that there are different tests to weigh against 1 another when considering his risk for prostate cancer progression.  He understands that there are many concerning findings on the MRI of the prostate as well as on his prostate biopsy.  However, the PSA is also stable and the decipher test came back consistent with a low-grade prostate cancer.    Due to the concerning findings on the MRI as well as biopsy data Tai understands that I continue to recommend radical treatment for his prostate cancer in the form of either surgery or radiotherapy.  He would like to proceed with active surveillance if possible.  He has a good understanding of the risks of active surveillance.  He also did ask about ablative options today.  We discussed ablative options, but he would not really be a candidate for partial gland therapy since cancer was also found on the left side of the prostate, where the MRI did not detect prostate cancer.  He could consider whole gland therapy such as cryotherapy.    He wishes to continue with  active surveillance.  In light of that I recommended that we repeat a PSA as well as MRI of the prostate in 6 months.  We will discuss the results but at that time we will also need to proceed with confirmatory prostate biopsy and potentially send this second biopsy for decipher testing again.    Total time spent today in review of outside records and test results, discussion with the patient, and documentation: 25 minutes      Juan Carlos Song M.D.

## 2023-05-05 ENCOUNTER — TELEPHONE (OUTPATIENT)
Dept: UROLOGY | Facility: CLINIC | Age: 68
End: 2023-05-05
Payer: COMMERCIAL

## 2023-05-05 NOTE — TELEPHONE ENCOUNTER
PSA orders are in his chart,he will do the PSA along with his labs through his PMD.  Tara Mcmillan LPN

## 2023-05-05 NOTE — TELEPHONE ENCOUNTER
[facility] Call Center    Phone Message    May a detailed message be left on voicemail: yes     Reason for Call: Order(s): Complete labs  Reason for requested: The patient called asking if Dr. Song would order comprehensive labs? He says he typically gets them done through his PCP, but the patient says he needs to get PSA drawn and would like to do them at the same time. Please review and advise. Thank you.  Date needed: ASAP  Provider name: Duncan    Action Taken: Message routed to:  Other: Urology    Travel Screening: Not Applicable

## 2023-05-21 ENCOUNTER — HEALTH MAINTENANCE LETTER (OUTPATIENT)
Age: 68
End: 2023-05-21

## 2023-05-22 ENCOUNTER — ANCILLARY PROCEDURE (OUTPATIENT)
Dept: MRI IMAGING | Facility: CLINIC | Age: 68
End: 2023-05-22
Attending: UROLOGY
Payer: COMMERCIAL

## 2023-05-22 DIAGNOSIS — C61 PROSTATE CANCER (H): ICD-10-CM

## 2023-05-22 PROCEDURE — A9585 GADOBUTROL INJECTION: HCPCS | Performed by: RADIOLOGY

## 2023-05-22 PROCEDURE — 72197 MRI PELVIS W/O & W/DYE: CPT | Performed by: RADIOLOGY

## 2023-05-22 RX ORDER — GADOBUTROL 604.72 MG/ML
10 INJECTION INTRAVENOUS ONCE
Status: COMPLETED | OUTPATIENT
Start: 2023-05-22 | End: 2023-05-22

## 2023-05-22 RX ADMIN — GADOBUTROL 10 ML: 604.72 INJECTION INTRAVENOUS at 12:21

## 2023-05-30 ENCOUNTER — OFFICE VISIT (OUTPATIENT)
Dept: UROLOGY | Facility: CLINIC | Age: 68
End: 2023-05-30
Payer: COMMERCIAL

## 2023-05-30 VITALS
SYSTOLIC BLOOD PRESSURE: 156 MMHG | HEIGHT: 70 IN | DIASTOLIC BLOOD PRESSURE: 91 MMHG | HEART RATE: 75 BPM | OXYGEN SATURATION: 98 % | BODY MASS INDEX: 31.5 KG/M2 | WEIGHT: 220 LBS

## 2023-05-30 DIAGNOSIS — C61 PROSTATE CANCER (H): Primary | ICD-10-CM

## 2023-05-30 PROCEDURE — 99214 OFFICE O/P EST MOD 30 MIN: CPT | Performed by: UROLOGY

## 2023-05-30 ASSESSMENT — PAIN SCALES - GENERAL: PAINLEVEL: NO PAIN (0)

## 2023-05-30 NOTE — LETTER
5/30/2023       RE: Tai Gilbert  [address]  [facility] [address]     Dear Colleague,    Thank you for referring your patient, Tai Gilbert, to the [facility] at [facility]. Please see a copy of my visit note below.    Office Visit Note  [facility]  [phone]    UROLOGIC DIAGNOSES:   Favorable intermediate risk prostate cancer  (Decipher low risk)    CURRENT INTERVENTIONS:   Active surveillance  Prostate biopsy x 1    HISTORY:   Tai returns to clinic today for prostate cancer follow-up.  A recent PSA checked 2 weeks ago was up at 11.  He reports that he had an ejaculation just prior to that PSA check.  He reports to me that he had the PSA rechecked soon after and that the PSA was down to 7.  I looked again and I do not have that follow-up result of a PSA of 7 but he assures me that this was the case.  He repeated his MRI of the prostate last week and the MRI.  Similar to MRI from 1 year ago.  There remains a high suspicion of extraprostatic extension of the lesion on the right side.  He continues to have no urinary symptoms or complaints.      PAST MEDICAL HISTORY: History reviewed. No pertinent past medical history.    PAST SURGICAL HISTORY:   Past Surgical History:   Procedure Laterality Date    HERNIA REPAIR         FAMILY HISTORY:   Family History   Problem Relation Age of Onset    Heart Disease Mother     Cancer Father     Enlarged prostate Son        SOCIAL HISTORY:   Social History     Socioeconomic History    Marital status:      Spouse name: None    Number of children: None    Years of education: None    Highest education level: None   Tobacco Use    Smoking status: Never    Smokeless tobacco: Never       Review Of Systems:  Skin: No rash, pruritis, or skin pigmentation  Eyes: No changes in vision  Ears/Nose/Throat: No changes in hearing, no nosebleeds  Respiratory: No shortness of breath, dyspnea on  "exertion, cough, or hemoptysis  Cardiovascular: No chest pain or palpitations  Gastrointestinal: No diarrhea or constipation. No abdominal pain. No hematochezia  Genitourinary: see HPI  Musculoskeletal: No pain or swelling of joints, normal range of motion  Neurologic: No weakness or tremors  Psychiatric: No recent changes in memory or mood  Hematologic/Lymphatic/Immunologic: No easy bruising or enlarged lymph nodes  Endocrine: No weight gain or loss      PHYSICAL EXAM:    BP (!) 156/91   Pulse 75   Ht 1.778 m (5' 10\")   Wt 99.8 kg (220 lb)   SpO2 98%   BMI 31.57 kg/m      Constitutional: Well developed. Conversant and in no acute distress  Eyes: Anicteric sclera, conjunctiva clear, normal extraocular movements  ENT: Normocephalic and atraumatic,   Skin: Warm and dry. No rashes or lesions  Cardiac: No peripheral edema  Back/Flank: Not done  CNS/PNS: Normal musculature and movements, moves all extremities normally  Respiratory: Normal non-labored breathing  Abdomen: Soft nontender and nondistended  Peripheral Vascular: No peripheral edema  Mental Status/Psych: Alert and Oriented x 3. Normal mood and affect    Penis: Not done  Scrotal Skin: Not done  Testicles: Not done  Epididymis: Not done  Digital Rectal Exam:     Cystoscopy: Not done    Imaging: None    Urinalysis: UA RESULTS:  No results for input(s): COLOR, APPEARANCE, URINEGLC, URINEBILI, URINEKETONE, SG, UBLD, URINEPH, PROTEIN, UROBILINOGEN, NITRITE, LEUKEST, RBCU, WBCU in the last 30725 hours.    PSA: Patient self-reports a recent PSA of 7.  I do not have these results available to me    Post Void Residual:     Other labs: None today      IMPRESSION:  Favorable intermediate risk prostate cancer    PLAN:  Today I again had a discussion with Tai regarding the state of his prostate cancer.  I continue to recommend radical treatment for his prostate cancer.  He understands the recommendation and he declines radical treatment at this time.  He would like to " pursue active surveillance, understanding the risks.    In light of his wish to pursue active surveillance, I recommended that he undergo his confirmatory biopsy at this time.  It has been 1 year since he was diagnosed with the prostate cancer.  Standard recommendations are to always perform confirmatory biopsy within 2 years of cancer diagnosis and he understands this.  In his case, because of very concerning findings on his MRI I would recommend confirmatory biopsy at this time if he does not wish to treat the cancer.  He also declines confirmatory biopsy at this time.  He prefers to recheck the PSA in 3 months so I will plan to see him back again in 3 months to recheck the PSA.    He also had questions for me today about focal therapy, in particular HIFU.  We discussed focal therapy today as well along with its rationale and potential risks.  I counseled Tai that he would not be a candidate for focal therapy given the extraprostatic extension seen on the MRI and given the cancer found outside the lesion seen on the MRI.      Juan Carlos Song M.D.

## 2023-05-30 NOTE — NURSING NOTE
Chief Complaint   Patient presents with     Follow Up     Prostate Cancer   talk about M.R. prostate results  and labs too  Everything is going well   Hilda Noguera, clinic assistant

## 2024-07-28 ENCOUNTER — HEALTH MAINTENANCE LETTER (OUTPATIENT)
Age: 69
End: 2024-07-28

## 2025-08-10 ENCOUNTER — HEALTH MAINTENANCE LETTER (OUTPATIENT)
Age: 70
End: 2025-08-10